# Patient Record
(demographics unavailable — no encounter records)

---

## 2024-10-08 NOTE — PAST MEDICAL HISTORY
[Postmenopausal] : postmenopausal [Menopause Age____] : age at menopause was [unfilled] [Definite ___ (Date)] : the last menstrual period was [unfilled] [Total Preg ___] : G[unfilled] [Live Births ___] : P[unfilled]

## 2024-10-08 NOTE — PHYSICAL EXAM
[No Edema] : there was no peripheral edema [Normal] : soft, non-tender, non-distended, no masses palpated, no HSM and normal bowel sounds [Coordination Grossly Intact] : coordination grossly intact [Memory Grossly Normal] : memory grossly normal [Alert and Oriented x3] : oriented to person, place, and time

## 2024-10-08 NOTE — PHYSICAL EXAM
[Alert] : alert [Normal Voice/Communication] : normal voice/communication [Healthy Appearing] : healthy appearing [No Acute Distress] : no acute distress [Sclera] : the sclera and conjunctiva were normal [Hearing Threshold Finger Rub Not [place]] : hearing was normal [Normal Lips/Gums] : the lips and gums were normal [Oropharynx] : the oropharynx was normal [Normal Appearance] : the appearance of the neck was normal [No Neck Mass] : no neck mass was observed [No Respiratory Distress] : no respiratory distress [Respiration, Rhythm And Depth] : normal respiratory rhythm and effort [No Acc Muscle Use] : no accessory muscle use [Auscultation Breath Sounds / Voice Sounds] : lungs were clear to auscultation bilaterally [Heart Rate And Rhythm] : heart rate was normal and rhythm regular [Normal S1, S2] : normal S1 and S2 [Murmurs] : no murmurs [None] : no edema [Bowel Sounds] : normal bowel sounds [Abdomen Tenderness] : non-tender [Abdomen Soft] : soft [No Masses] : no abdominal mass palpated [Supraclavicular Lymph Nodes Enlarged Bilaterally] : no supraclavicular lymphadenopathy [Cervical Lymph Nodes Enlarged Posterior Bilaterally] : no posterior cervical lymphadenopathy [Cervical Lymph Nodes Enlarged Anterior Bilaterally] : no anterior cervical lymphadenopathy [No Spinal Tenderness] : no spinal tenderness [Abnormal Walk] : normal gait [No Clubbing, Cyanosis] : no clubbing or cyanosis of the fingernails [Normal Color / Pigmentation] : normal skin color and pigmentation [] : no rash [No Focal Deficits] : no focal deficits [Oriented To Time, Place, And Person] : oriented to person, place, and time [No Edema] : there was no peripheral edema [Normal] : soft, non-tender, non-distended, no masses palpated, no HSM and normal bowel sounds [Coordination Grossly Intact] : coordination grossly intact [Memory Grossly Normal] : memory grossly normal [Alert and Oriented x3] : oriented to person, place, and time

## 2024-10-09 NOTE — REVIEW OF SYSTEMS
[Headache] : headache [Negative] : Heme/Lymph [Suicidal] : not suicidal [Insomnia] : no insomnia [Anxiety] : no anxiety [Depression] : no depression [FreeTextEntry7] : fiber- daily  [de-identified] : on occasion from concussion  [de-identified] : sleepy time tea -

## 2024-10-09 NOTE — RESULTS/DATA
[] : results reviewed [de-identified] : wbc better- most likely due to steroid  [de-identified] : low creat- will monitor

## 2024-10-09 NOTE — HEALTH RISK ASSESSMENT
[Good] : ~his/her~  mood as  good [No] : In the past 12 months have you used drugs other than those required for medical reasons? No [0] : 2) Feeling down, depressed, or hopeless: Not at all (0) [PHQ-2 Negative - No further assessment needed] : PHQ-2 Negative - No further assessment needed [Never] : Never [Patient reported mammogram was normal] : Patient reported mammogram was normal [Patient reported PAP Smear was normal] : Patient reported PAP Smear was normal [HIV test declined] : HIV test declined [Hepatitis C test declined] : Hepatitis C test declined [None] : None [With Significant Other] : lives with significant other [Employed] : employed [] :  [Feels Safe at Home] : Feels safe at home [Fully functional (bathing, dressing, toileting, transferring, walking, feeding)] : Fully functional (bathing, dressing, toileting, transferring, walking, feeding) [Fully functional (using the telephone, shopping, preparing meals, housekeeping, doing laundry, using] : Fully functional and needs no help or supervision to perform IADLs (using the telephone, shopping, preparing meals, housekeeping, doing laundry, using transportation, managing medications and managing finances) [Smoke Detector] : smoke detector [Carbon Monoxide Detector] : carbon monoxide detector [Safety elements used in home] : safety elements used in home [Seat Belt] :  uses seat belt [Sunscreen] : uses sunscreen [de-identified] : denying  [[id number]] : 0 [Change in mental status noted] : No change in mental status noted [Language] : denies difficulty with language [High Risk Behavior] : no high risk behavior [Reports changes in hearing] : Reports no changes in hearing [Reports changes in vision] : Reports no changes in vision [Reports changes in dental health] : Reports no changes in dental health [MammogramDate] : 7/2024 [MammogramComments] : wnl  [PapSmearDate] : 2023 [PapSmearComments] : Dr Montgomery - kristian always wnl  [ColonoscopyComments] : due in Dec- sees dr bowling  [FreeTextEntry2] : - currently out on workers comp  [de-identified] : states saw opth and was concerned  about cup size in one eye- going for second opinion - at [facility]  [de-identified] : - Dr Yani Alarcon is due for TBSE [FreeTextEntry4] : [facility] Benjamin Coelho  spouse

## 2024-10-09 NOTE — HISTORY OF PRESENT ILLNESS
[No Pertinent Cardiac History] : no history of aortic stenosis, atrial fibrillation, coronary artery disease, recent myocardial infarction, or implantable device/pacemaker [No Pertinent Pulmonary History] : no history of asthma, COPD, sleep apnea, or smoking [No Adverse Anesthesia Reaction] : no adverse anesthesia reaction in self or family member [(Patient denies any chest pain, claudication, dyspnea on exertion, orthopnea, palpitations or syncope)] : Patient denies any chest pain, claudication, dyspnea on exertion, orthopnea, palpitations or syncope [Moderate (4-6 METs)] : Moderate (4-6 METs) [de-identified] : 09/27/2024  58 year  old female  presents for check up   PMH: HLD, GERD  history of erosive esophagitis as well as adenomatous polyps of the colon and diverticular disease of the colon did stem cell treatment- for a leg wound -  sees Dr Russell - Neurology -   PSH: ankle surgery, c sect,  Fam hx:     first cousin - breast cancer passed 50 yo    mother- pancreatic cancer - dxd at  passed 89 yo    dad: passed- 55 yo - smoking/alcohol. - PNA     sister- unknown  brother- preDM   Allergies: sulfa  Meds: Supplements: Taking Iron every other day black cohosh, mag glyc , multiv, vit D q other day, iron q other day ,   B complex     [Asthma] : no asthma [COPD] : no COPD [Sleep Apnea] : no sleep apnea [Smoker] : not a smoker [Family Member] : no family member with adverse anesthesia reaction/sudden death [Self] : no previous adverse anesthesia reaction [Chronic Anticoagulation] : no chronic anticoagulation [Chronic Kidney Disease] : no chronic kidney disease [Diabetes] : no diabetes [FreeTextEntry1] : R shoulder  arthroscopy- rotator cuff repair  [FreeTextEntry2] : 10/25/2024 [FreeTextEntry3] : Dr Yanez  [FreeTextEntry4] : 58 year old female presents for pre op clearance PMH: HLD, GERD  prior history of erosive esophagitis as well as adenomatous polyps of the colon and diverticular disease of the colon Allergies: sulfa  Meds: : pepcid  Taking Iron every other day black cohosh, mag glyc , multiv, vit D q other day, iron q other day ,  B complex (will hold week prior)    Medications reviewed with pt - patient directed on which medications to take and not take. Patient understands to avoid herbals, vitamins, blood thinners, and NSAIDs a week prior to surgery NPO night before

## 2024-10-09 NOTE — REVIEW OF SYSTEMS
[Headache] : headache [Negative] : Heme/Lymph [Suicidal] : not suicidal [Insomnia] : no insomnia [Anxiety] : no anxiety [Depression] : no depression [FreeTextEntry7] : fiber- daily  [de-identified] : on occasion from concussion  [de-identified] : sleepy time tea -

## 2024-10-09 NOTE — HISTORY OF PRESENT ILLNESS
[No Pertinent Cardiac History] : no history of aortic stenosis, atrial fibrillation, coronary artery disease, recent myocardial infarction, or implantable device/pacemaker [No Pertinent Pulmonary History] : no history of asthma, COPD, sleep apnea, or smoking [No Adverse Anesthesia Reaction] : no adverse anesthesia reaction in self or family member [(Patient denies any chest pain, claudication, dyspnea on exertion, orthopnea, palpitations or syncope)] : Patient denies any chest pain, claudication, dyspnea on exertion, orthopnea, palpitations or syncope [Moderate (4-6 METs)] : Moderate (4-6 METs) [de-identified] : 09/27/2024  58 year  old female  presents for check up   PMH: HLD, GERD  history of erosive esophagitis as well as adenomatous polyps of the colon and diverticular disease of the colon did stem cell treatment- for a leg wound -  sees Dr Russell - Neurology -   PSH: ankle surgery, c sect,  Fam hx:     first cousin - breast cancer passed 48 yo    mother- pancreatic cancer - dxd at  passed 87 yo    dad: passed- 53 yo - smoking/alcohol. - PNA     sister- unknown  brother- preDM   Allergies: sulfa  Meds: Supplements: Taking Iron every other day black cohosh, mag glyc , multiv, vit D q other day, iron q other day ,   B complex     [Asthma] : no asthma [COPD] : no COPD [Sleep Apnea] : no sleep apnea [Smoker] : not a smoker [Family Member] : no family member with adverse anesthesia reaction/sudden death [Self] : no previous adverse anesthesia reaction [Chronic Anticoagulation] : no chronic anticoagulation [Chronic Kidney Disease] : no chronic kidney disease [Diabetes] : no diabetes [FreeTextEntry1] : R shoulder  arthroscopy- rotator cuff repair  [FreeTextEntry2] : 10/25/2024 [FreeTextEntry3] : Dr Yanez  [FreeTextEntry4] : 58 year old female presents for pre op clearance PMH: HLD, GERD  prior history of erosive esophagitis as well as adenomatous polyps of the colon and diverticular disease of the colon Allergies: sulfa  Meds: : pepcid  Taking Iron every other day black cohosh, mag glyc , multiv, vit D q other day, iron q other day ,  B complex (will hold week prior)    Medications reviewed with pt - patient directed on which medications to take and not take. Patient understands to avoid herbals, vitamins, blood thinners, and NSAIDs a week prior to surgery NPO night before

## 2024-10-09 NOTE — PLAN
[FreeTextEntry1] :  Discussed risk vs benefit of proceeding - including but not limited to infection, bleeding, nerve injury, clots, other medical complications such as from anesthesia,

## 2024-10-09 NOTE — HEALTH RISK ASSESSMENT
[Good] : ~his/her~  mood as  good [No] : In the past 12 months have you used drugs other than those required for medical reasons? No [0] : 2) Feeling down, depressed, or hopeless: Not at all (0) [PHQ-2 Negative - No further assessment needed] : PHQ-2 Negative - No further assessment needed [Never] : Never [Patient reported mammogram was normal] : Patient reported mammogram was normal [Patient reported PAP Smear was normal] : Patient reported PAP Smear was normal [HIV test declined] : HIV test declined [Hepatitis C test declined] : Hepatitis C test declined [None] : None [With Significant Other] : lives with significant other [Employed] : employed [] :  [Feels Safe at Home] : Feels safe at home [Fully functional (bathing, dressing, toileting, transferring, walking, feeding)] : Fully functional (bathing, dressing, toileting, transferring, walking, feeding) [Fully functional (using the telephone, shopping, preparing meals, housekeeping, doing laundry, using] : Fully functional and needs no help or supervision to perform IADLs (using the telephone, shopping, preparing meals, housekeeping, doing laundry, using transportation, managing medications and managing finances) [Smoke Detector] : smoke detector [Carbon Monoxide Detector] : carbon monoxide detector [Safety elements used in home] : safety elements used in home [Seat Belt] :  uses seat belt [Sunscreen] : uses sunscreen [de-identified] : denying  [[id number]] : 0 [Change in mental status noted] : No change in mental status noted [Language] : denies difficulty with language [High Risk Behavior] : no high risk behavior [Reports changes in hearing] : Reports no changes in hearing [Reports changes in vision] : Reports no changes in vision [Reports changes in dental health] : Reports no changes in dental health [MammogramDate] : 7/2024 [MammogramComments] : wnl  [PapSmearDate] : 2023 [PapSmearComments] : Dr Montgomery - [facility] always wnl  [ColonoscopyComments] : due in Dec- sees dr bowling  [FreeTextEntry2] : - currently out on workers comp  [de-identified] : states saw opth and was concerned  about cup size in one eye- going for second opinion - at [facility]  [de-identified] : - Dr Yani Alarcon is due for TBSE [FreeTextEntry4] : [facility] Benjamin Coelho  spouse

## 2024-10-09 NOTE — RESULTS/DATA
[] : results reviewed [de-identified] : wbc better- most likely due to steroid  [de-identified] : low creat- will monitor

## 2025-01-06 NOTE — HISTORY OF PRESENT ILLNESS
[5] : 5 [4] : 4 [Dull/Aching] : dull/aching [Sharp] : sharp [de-identified] : Patient states that she is feeling slightly better. She does have some pain and discomfort still. Patient has finished PT, and she does feel like it has helped and would like another script for PT. Patient states that she is going to have surgery on her right shoulder for the torn rotator cuff on October 25th.  1/6/25; patient is here for a follow up for the left shoulder. Patient states that the shoulder has been feeling better with physical therapy.  [de-identified] : pt
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
## 2025-01-06 NOTE — REASON FOR VISIT
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 There is an open worker's comp case for the right shoulder rotator cuff tear from 8/29/23. The left shoulder pain started without injury. She went to an outside orthopedist who prescribed a muscle relaxer, Ibuprofen, and Tylenol which did not help much. She took a MDP and "felt amazing" for the first 2 days.  There is no n/t. NSAID use as needed with temporary relief. The left SA/GH injection done in 8/2024 helped drastically.  She is feeling 80% better.  The therapy has continued to be successful.

## 2025-01-06 NOTE — ASSESSMENT
[FreeTextEntry1] : PT will continue. She is making gains. Questions answered. A repeat injection is a consideration. Follow up as needed.  Patient seen by Dr. Jose Bosch, who determined the assessment and plan. Tonya PANDA participated in the care of the patient, including the history and physical exam.

## 2025-01-17 NOTE — PHYSICAL EXAM
[Alert] : alert [Normal Voice/Communication] : normal voice/communication [Healthy Appearing] : healthy appearing [No Acute Distress] : no acute distress [Sclera] : the sclera and conjunctiva were normal [Hearing Threshold Finger Rub Not [place]] : hearing was normal [Normal Lips/Gums] : the lips and gums were normal [Oropharynx] : the oropharynx was normal [Normal Appearance] : the appearance of the neck was normal [No Neck Mass] : no neck mass was observed [No Respiratory Distress] : no respiratory distress [No Acc Muscle Use] : no accessory muscle use [Respiration, Rhythm And Depth] : normal respiratory rhythm and effort [Auscultation Breath Sounds / Voice Sounds] : lungs were clear to auscultation bilaterally [Heart Rate And Rhythm] : heart rate was normal and rhythm regular [Normal S1, S2] : normal S1 and S2 [Murmurs] : no murmurs [Bowel Sounds] : normal bowel sounds [Abdomen Tenderness] : non-tender [No Masses] : no abdominal mass palpated [Abdomen Soft] : soft [] : no hepatosplenomegaly [Oriented To Time, Place, And Person] : oriented to person, place, and time

## 2025-01-17 NOTE — HISTORY OF PRESENT ILLNESS
[FreeTextEntry1] : Chief complaint: gerd, abdominal pain   HPI: Patient presents for gastroenterology followup because of multiple complex gastrointestinal signs and symptoms. I reviewed multiple reports with the patient including reports of colonoscopy, endoscopy and histopathology.   She still feels dyspepsia and indigestion and epigastric pain despite antacids.  Will send new prescriptions to the pharmacy for rabeprazole and famotidine.  gerd diet reviewed with patient   Moderate degree of complexity of medical decision making involved in this patient encounter.

## 2025-01-17 NOTE — ASSESSMENT
[FreeTextEntry1] : Patient presents for evaluation and management of multiple complex Gastrointestinal signs and symptoms. Complex syndrome of abdominal pain and epigastric dypepsia.   New prescription for rabeprazole sent to pharmacy.   I reviewed multiple reports with the patient including reports of endoscopy, colonoscopy and histopathology.   Moderate degree of complexity of medical decision making involved in this patient encounter. 
allergic reaction

## 2025-02-18 NOTE — PHYSICAL EXAM
[No Acute Distress] : no acute distress [No Edema] : there was no peripheral edema [No Extremity Clubbing/Cyanosis] : no extremity clubbing/cyanosis [Normal] : affect was normal and insight and judgment were intact

## 2025-02-19 NOTE — RESULTS/DATA
[de-identified] : pending [de-identified] : pending [de-identified] : pending [] : results reviewed [de-identified] : sinus rhythm; non-acute

## 2025-02-19 NOTE — REVIEW OF SYSTEMS
[Heartburn] : heartburn [Negative] : ENT [Fever] : no fever [Chills] : no chills [Chest Pain] : no chest pain [Palpitations] : no palpitations [Shortness Of Breath] : no shortness of breath [Wheezing] : no wheezing [Constipation] : no constipation [Diarrhea] : diarrhea [Vomiting] : no vomiting [Dysuria] : no dysuria [Headache] : no headache [de-identified] : Migraine-from concussion

## 2025-02-19 NOTE — RESULTS/DATA
[de-identified] : pending [de-identified] : pending [de-identified] : pending [] : results reviewed [de-identified] : sinus rhythm; non-acute

## 2025-02-19 NOTE — REVIEW OF SYSTEMS
[Heartburn] : heartburn [Negative] : ENT [Fever] : no fever [Chills] : no chills [Chest Pain] : no chest pain [Palpitations] : no palpitations [Shortness Of Breath] : no shortness of breath [Wheezing] : no wheezing [Constipation] : no constipation [Diarrhea] : diarrhea [Vomiting] : no vomiting [Dysuria] : no dysuria [Headache] : no headache [de-identified] : Migraine-from concussion

## 2025-02-19 NOTE — RESULTS/DATA
[de-identified] : pending [de-identified] : pending [de-identified] : pending [] : results reviewed [de-identified] : sinus rhythm; non-acute

## 2025-02-19 NOTE — REVIEW OF SYSTEMS
[Heartburn] : heartburn [Negative] : ENT [Fever] : no fever [Chills] : no chills [Chest Pain] : no chest pain [Palpitations] : no palpitations [Shortness Of Breath] : no shortness of breath [Wheezing] : no wheezing [Constipation] : no constipation [Diarrhea] : diarrhea [Vomiting] : no vomiting [Dysuria] : no dysuria [Headache] : no headache [de-identified] : Migraine-from concussion

## 2025-02-19 NOTE — ASSESSMENT
[High Risk Surgery - Intraperitoneal, Intrathoracic or Supringuinal Vascular Procedures] : High Risk Surgery - Intraperitoneal, Intrathoracic or Supringuinal Vascular Procedures - No (0) [Ischemic Heart Disease] : Ischemic Heart Disease - No (0) [Congestive Heart Failure] : Congestive Heart Failure - No (0) [Prior Cerebrovascular Accident or TIA] : Prior Cerebrovascular Accident or TIA - No (0) [Creatinine >= 2mg/dL (1 Point)] : Creatinine >= 2mg/dL - No (0) [Insulin-dependent Diabetic (1 Point)] : Insulin-dependent Diabetic - No (0) [0] : 0 , RCRI Class: I, Risk of Post-Op Cardiac Complications: 3.9%, 95% CI for Risk Estimate: 2.8% - 5.4% [Patient Optimized for Surgery] : Patient optimized for surgery [As per surgery] : as per surgery [FreeTextEntry4] : Labs in range; EKG performed-sinus no acute changes.   Patient was counseled to stop any Advil/Aleve/aspirin and any blood-thinning medications/supplements 7 days prior to surgery and was advised to have nothing by mouth from 11 pm the night prior to surgery.

## 2025-02-19 NOTE — HISTORY OF PRESENT ILLNESS
[No Adverse Anesthesia Reaction] : no adverse anesthesia reaction in self or family member [(Patient denies any chest pain, claudication, dyspnea on exertion, orthopnea, palpitations or syncope)] : Patient denies any chest pain, claudication, dyspnea on exertion, orthopnea, palpitations or syncope [Atrial Fibrillation] : no atrial fibrillation [Coronary Artery Disease] : no coronary artery disease [Recent Myocardial Infarction] : no recent myocardial infarction [Implantable Device/Pacemaker] : no implantable device/pacemaker [Asthma] : no asthma [COPD] : no COPD [Sleep Apnea] : no sleep apnea [Smoker] : not a smoker [Chronic Anticoagulation] : no chronic anticoagulation [Chronic Kidney Disease] : no chronic kidney disease [Diabetes] : no diabetes [FreeTextEntry1] : Right Rotator cuff tear repair [FreeTextEntry2] : 03/10/25 [FreeTextEntry3] : Dr. Yanez [FreeTextEntry4] : Here for pre-operative evaluation  Menopause-age 57 LMP-November 23 Saw GYN-end of 2024  Neuro-Nurtec, Naprosyn  Taking OTC MVI, Mg Glycinate, Fish Oil, Glucosamine, Iron, Vit D     [FreeTextEntry7] : Echo 2022; Stress 2022

## 2025-02-19 NOTE — PLAN
[FreeTextEntry1] : Labs in range; EKG performed-sinus no acute changes.   Patient was counseled to stop any Advil/Aleve/aspirin and any blood-thinning medications/supplements 7 days prior to surgery and was advised to have nothing by mouth from 11 pm the night prior to surgery.